# Patient Record
Sex: FEMALE | Race: BLACK OR AFRICAN AMERICAN | NOT HISPANIC OR LATINO | URBAN - METROPOLITAN AREA
[De-identification: names, ages, dates, MRNs, and addresses within clinical notes are randomized per-mention and may not be internally consistent; named-entity substitution may affect disease eponyms.]

---

## 2022-07-06 RX ORDER — LANOLIN ALCOHOL/MO/W.PET/CERES
CREAM (GRAM) TOPICAL
COMMUNITY
Start: 2021-12-30

## 2022-07-06 RX ORDER — IBUPROFEN 200 MG
CAPSULE ORAL
COMMUNITY

## 2022-09-15 PROBLEM — G56.01 CARPAL TUNNEL SYNDROME OF RIGHT WRIST: Status: ACTIVE | Noted: 2022-09-15

## 2022-09-15 PROBLEM — Z87.39 HISTORY OF SACROILIAC JOINT DYSFUNCTION: Status: ACTIVE | Noted: 2022-09-15

## 2022-10-11 PROBLEM — M25.50 POLYARTHRALGIA: Status: ACTIVE | Noted: 2022-10-11

## 2022-10-11 PROBLEM — M25.569 KNEE PAIN: Status: ACTIVE | Noted: 2022-10-11

## 2022-10-11 PROBLEM — M17.12 ARTHRITIS OF LEFT KNEE: Status: ACTIVE | Noted: 2022-10-11

## 2022-10-11 PROBLEM — M25.552 LEFT HIP PAIN: Status: ACTIVE | Noted: 2022-10-11

## 2022-10-11 PROBLEM — M79.606 PAIN IN LOWER LIMB: Status: ACTIVE | Noted: 2019-04-19

## 2022-10-11 PROBLEM — M65.9 SYNOVITIS AND TENOSYNOVITIS: Status: ACTIVE | Noted: 2019-04-19

## 2022-10-11 PROBLEM — M77.41 METATARSALGIA OF BOTH FEET: Status: ACTIVE | Noted: 2019-04-19

## 2022-10-11 PROBLEM — D50.9 MICROCYTIC ANEMIA: Status: ACTIVE | Noted: 2022-10-11

## 2022-10-11 PROBLEM — M81.0 AGE RELATED OSTEOPOROSIS: Status: ACTIVE | Noted: 2022-10-11

## 2022-10-11 PROBLEM — M25.511 PAIN IN RIGHT SHOULDER: Status: ACTIVE | Noted: 2022-10-11

## 2022-10-11 PROBLEM — R92.8 ABNORMAL MAMMOGRAM OF RIGHT BREAST: Status: ACTIVE | Noted: 2022-10-11

## 2022-10-11 PROBLEM — M75.51 BURSITIS OF SHOULDER, RIGHT: Status: ACTIVE | Noted: 2022-10-11

## 2022-10-11 PROBLEM — M17.11 ARTHRITIS OF RIGHT KNEE: Status: ACTIVE | Noted: 2022-10-11

## 2022-10-11 PROBLEM — E78.5 DYSLIPIDEMIA: Status: ACTIVE | Noted: 2022-10-11

## 2022-10-11 PROBLEM — G57.60 PLANTAR NERVE LESION: Status: ACTIVE | Noted: 2019-04-19

## 2022-10-11 PROBLEM — M77.42 METATARSALGIA OF BOTH FEET: Status: ACTIVE | Noted: 2019-04-19

## 2022-10-11 PROBLEM — J30.2 SEASONAL ALLERGIES: Status: ACTIVE | Noted: 2022-10-11

## 2022-10-27 PROBLEM — Z86.59 HISTORY OF CLAUSTROPHOBIA: Status: ACTIVE | Noted: 2022-10-27

## 2022-10-27 PROBLEM — M79.18 BILATERAL BUTTOCK PAIN: Status: ACTIVE | Noted: 2022-10-27

## 2022-10-27 PROBLEM — G89.29 CHRONIC BILATERAL LOW BACK PAIN WITH LEFT-SIDED SCIATICA: Status: ACTIVE | Noted: 2022-10-27

## 2022-10-27 PROBLEM — M54.16 LEFT LUMBAR RADICULITIS: Status: ACTIVE | Noted: 2022-10-27

## 2022-10-27 PROBLEM — M54.42 CHRONIC BILATERAL LOW BACK PAIN WITH LEFT-SIDED SCIATICA: Status: ACTIVE | Noted: 2022-10-27

## 2024-07-25 ENCOUNTER — APPOINTMENT (RX ONLY)
Dept: URBAN - METROPOLITAN AREA CLINIC 20 | Facility: CLINIC | Age: 69
Setting detail: DERMATOLOGY
End: 2024-07-25

## 2024-07-25 VITALS — WEIGHT: 194 LBS | HEIGHT: 65 IN

## 2024-07-25 DIAGNOSIS — L91.0 HYPERTROPHIC SCAR: ICD-10-CM

## 2024-07-25 PROCEDURE — 11900 INJECT SKIN LESIONS </W 7: CPT

## 2024-07-25 PROCEDURE — ? COUNSELING

## 2024-07-25 PROCEDURE — ? INTRALESIONAL KENALOG

## 2024-07-25 PROCEDURE — ? OTHER

## 2024-07-25 ASSESSMENT — LOCATION DETAILED DESCRIPTION DERM
LOCATION DETAILED: LEFT AXILLARY VAULT
LOCATION DETAILED: LEFT POSTERIOR AXILLA

## 2024-07-25 ASSESSMENT — LOCATION ZONE DERM: LOCATION ZONE: AXILLAE

## 2024-07-25 ASSESSMENT — LOCATION SIMPLE DESCRIPTION DERM
LOCATION SIMPLE: LEFT POSTERIOR AXILLA
LOCATION SIMPLE: LEFT AXILLARY VAULT

## 2024-07-25 NOTE — PROCEDURE: OTHER
Other (Free Text): Plan for shave removal of keloid on left upper axillae.\\nDiscussed treatment plan with Philip Saleem PA-C.
Detail Level: Zone
Note Text (......Xxx Chief Complaint.): This diagnosis correlates with the
Render Risk Assessment In Note?: no

## 2024-07-25 NOTE — PROCEDURE: INTRALESIONAL KENALOG
Lot # For Kenalog (Optional): [id number]
How Many Mls Were Removed From The 80 Mg/Ml (5ml) Vial When Preparing The Injectable Solution?: 0
Administered By (Optional): Scarlett Coronado PA-C
Medical Necessity Clause: This procedure was medically necessary because the lesions that were treated were:
Detail Level: Detailed
Validate Note Data When Using Inventory: Yes
Total Volume (Ccs): 0.3
Include Z78.9 (Other Specified Conditions Influencing Health Status) As An Associated Diagnosis?: No
Expiration Date For Kenalog (Optional): 2025-10
Consent: The risks of atrophy were reviewed with the patient.
Kenalog Type Of Vial: Multiple Dose
Ndc# For Kenalog Only: [address]-5
Concentration Of Kenalog Solution Injected (Mg/Ml): 15.0
Show Inventory Tab: Hide
Kenalog Preparation: Kenalog